# Patient Record
Sex: FEMALE | Race: AMERICAN INDIAN OR ALASKA NATIVE | HISPANIC OR LATINO | Employment: PART TIME | URBAN - METROPOLITAN AREA
[De-identification: names, ages, dates, MRNs, and addresses within clinical notes are randomized per-mention and may not be internally consistent; named-entity substitution may affect disease eponyms.]

---

## 2019-11-28 ENCOUNTER — HOSPITAL ENCOUNTER (EMERGENCY)
Facility: HOSPITAL | Age: 39
Discharge: HOME | End: 2019-11-28
Attending: EMERGENCY MEDICINE | Admitting: EMERGENCY MEDICINE
Payer: COMMERCIAL

## 2019-11-28 VITALS
RESPIRATION RATE: 19 BRPM | DIASTOLIC BLOOD PRESSURE: 55 MMHG | SYSTOLIC BLOOD PRESSURE: 111 MMHG | OXYGEN SATURATION: 99 % | HEART RATE: 64 BPM | WEIGHT: 158.73 LBS | BODY MASS INDEX: 29.51 KG/M2 | TEMPERATURE: 98.2 F

## 2019-11-28 DIAGNOSIS — R61 NIGHT SWEATS: ICD-10-CM

## 2019-11-28 DIAGNOSIS — R00.2 PALPITATIONS: Primary | ICD-10-CM

## 2019-11-28 DIAGNOSIS — F41.9 ANXIETY: ICD-10-CM

## 2019-11-28 DIAGNOSIS — R06.02 SHORT OF BREATH ON EXERTION: ICD-10-CM

## 2019-11-28 DIAGNOSIS — R53.83 FATIGUE: ICD-10-CM

## 2019-11-28 LAB
ANION GAP SERPL CALCULATED.3IONS-SCNC: 2 MMOL/L (ref 4–13)
BASOPHILS # BLD AUTO: 0.05 THOUSANDS/ΜL (ref 0–0.1)
BASOPHILS NFR BLD AUTO: 1 % (ref 0–1)
BUN SERPL-MCNC: 11 MG/DL (ref 5–25)
CALCIUM SERPL-MCNC: 8.7 MG/DL (ref 8.3–10.1)
CHLORIDE SERPL-SCNC: 108 MMOL/L (ref 100–108)
CO2 SERPL-SCNC: 30 MMOL/L (ref 21–32)
CREAT SERPL-MCNC: 0.73 MG/DL (ref 0.6–1.3)
EOSINOPHIL # BLD AUTO: 0.74 THOUSAND/ΜL (ref 0–0.61)
EOSINOPHIL NFR BLD AUTO: 10 % (ref 0–6)
ERYTHROCYTE [DISTWIDTH] IN BLOOD BY AUTOMATED COUNT: 12.6 % (ref 11.6–15.1)
GFR SERPL CREATININE-BSD FRML MDRD: 103 ML/MIN/1.73SQ M
GLUCOSE SERPL-MCNC: 77 MG/DL (ref 65–140)
HCT VFR BLD AUTO: 38 % (ref 34.8–46.1)
HGB BLD-MCNC: 12.2 G/DL (ref 11.5–15.4)
IMM GRANULOCYTES # BLD AUTO: 0.01 THOUSAND/UL (ref 0–0.2)
IMM GRANULOCYTES NFR BLD AUTO: 0 % (ref 0–2)
LYMPHOCYTES # BLD AUTO: 2.37 THOUSANDS/ΜL (ref 0.6–4.47)
LYMPHOCYTES NFR BLD AUTO: 31 % (ref 14–44)
MCH RBC QN AUTO: 31 PG (ref 26.8–34.3)
MCHC RBC AUTO-ENTMCNC: 32.1 G/DL (ref 31.4–37.4)
MCV RBC AUTO: 96 FL (ref 82–98)
MONOCYTES # BLD AUTO: 0.48 THOUSAND/ΜL (ref 0.17–1.22)
MONOCYTES NFR BLD AUTO: 6 % (ref 4–12)
NEUTROPHILS # BLD AUTO: 4.05 THOUSANDS/ΜL (ref 1.85–7.62)
NEUTS SEG NFR BLD AUTO: 52 % (ref 43–75)
NRBC BLD AUTO-RTO: 0 /100 WBCS
PLATELET # BLD AUTO: 269 THOUSANDS/UL (ref 149–390)
PMV BLD AUTO: 10.8 FL (ref 8.9–12.7)
POTASSIUM SERPL-SCNC: 3.9 MMOL/L (ref 3.5–5.3)
RBC # BLD AUTO: 3.94 MILLION/UL (ref 3.81–5.12)
SODIUM SERPL-SCNC: 140 MMOL/L (ref 136–145)
TROPONIN I SERPL-MCNC: <0.02 NG/ML
TSH SERPL DL<=0.05 MIU/L-ACNC: 2.08 UIU/ML (ref 0.36–3.74)
WBC # BLD AUTO: 7.7 THOUSAND/UL (ref 4.31–10.16)

## 2019-11-28 PROCEDURE — 84484 ASSAY OF TROPONIN QUANT: CPT | Performed by: EMERGENCY MEDICINE

## 2019-11-28 PROCEDURE — 99283 EMERGENCY DEPT VISIT LOW MDM: CPT | Performed by: EMERGENCY MEDICINE

## 2019-11-28 PROCEDURE — 80048 BASIC METABOLIC PNL TOTAL CA: CPT | Performed by: EMERGENCY MEDICINE

## 2019-11-28 PROCEDURE — 99285 EMERGENCY DEPT VISIT HI MDM: CPT

## 2019-11-28 PROCEDURE — 85025 COMPLETE CBC W/AUTO DIFF WBC: CPT | Performed by: EMERGENCY MEDICINE

## 2019-11-28 PROCEDURE — 36415 COLL VENOUS BLD VENIPUNCTURE: CPT | Performed by: EMERGENCY MEDICINE

## 2019-11-28 PROCEDURE — 93005 ELECTROCARDIOGRAM TRACING: CPT

## 2019-11-28 PROCEDURE — 84443 ASSAY THYROID STIM HORMONE: CPT | Performed by: EMERGENCY MEDICINE

## 2019-11-28 NOTE — ED PROVIDER NOTES
History  Chief Complaint   Patient presents with    Palpitations     10d of palpitations and night sweats, sob with any exercise       Palpitations   Palpitations quality:  Regular  Onset quality:  Gradual  Duration:  2 weeks  Timing:  Constant  Progression:  Waxing and waning  Chronicity:  New  Associated symptoms: shortness of breath    Associated symptoms: no chest pain, no cough, no nausea, no numbness, no vomiting and no weakness    Risk factors: no heart disease, no hx of atrial fibrillation, no hx of DVT, no hx of PE and no hx of thyroid disease        None       History reviewed  No pertinent past medical history  History reviewed  No pertinent surgical history  History reviewed  No pertinent family history  I have reviewed and agree with the history as documented  Social History     Tobacco Use    Smoking status: Never Smoker    Smokeless tobacco: Never Used   Substance Use Topics    Alcohol use: Not Currently    Drug use: Not Currently        Review of Systems   Constitutional: Positive for fatigue  Negative for chills and fever  HENT: Negative for rhinorrhea and sore throat  Eyes: Negative for photophobia and visual disturbance  Respiratory: Positive for shortness of breath  Negative for cough  Cardiovascular: Positive for palpitations  Negative for chest pain  Gastrointestinal: Negative for abdominal pain, blood in stool, diarrhea, nausea and vomiting  Genitourinary: Negative for dysuria, hematuria and menstrual problem  Musculoskeletal: Negative for neck pain and neck stiffness  Skin: Negative for rash and wound  Neurological: Negative for syncope, facial asymmetry, speech difficulty, weakness, numbness and headaches  Psychiatric/Behavioral: Negative for agitation and confusion  All other systems reviewed and are negative        Physical Exam  ED Triage Vitals   Temperature Pulse Respirations Blood Pressure SpO2   11/28/19 1327 11/28/19 1327 11/28/19 1323 11/28/19 1327 11/28/19 1327   98 2 °F (36 8 °C) 64 19 111/55 99 %      Temp src Heart Rate Source Patient Position - Orthostatic VS BP Location FiO2 (%)   -- -- -- -- --             Pain Score       --                    Orthostatic Vital Signs  Vitals:    11/28/19 1327   BP: 111/55   Pulse: 64       Physical Exam   Constitutional: She appears well-developed  No distress  HENT:   Head: Normocephalic  Right Ear: External ear normal    Left Ear: External ear normal    Nose: Nose normal    Mouth/Throat: Oropharynx is clear and moist    Eyes: Pupils are equal, round, and reactive to light  Conjunctivae and EOM are normal    Neck: No tracheal deviation present  Cardiovascular: Normal rate, normal heart sounds and intact distal pulses  No murmur heard  Pulmonary/Chest: Effort normal and breath sounds normal  No stridor  No respiratory distress  She has no wheezes  She has no rales  She exhibits no tenderness  Abdominal: Soft  She exhibits no distension  There is no tenderness  There is no rebound and no guarding  Musculoskeletal: She exhibits no edema, tenderness or deformity  Neurological: She is alert  No cranial nerve deficit or sensory deficit  She exhibits normal muscle tone  Skin: Skin is warm and dry  Capillary refill takes less than 2 seconds  She is not diaphoretic  Psychiatric: Her behavior is normal  Her mood appears anxious  Thought content is not paranoid and not delusional  She expresses no homicidal and no suicidal ideation  She expresses no suicidal plans and no homicidal plans  Nursing note and vitals reviewed        ED Medications  Medications - No data to display    Diagnostic Studies  Results Reviewed     Procedure Component Value Units Date/Time    Basic metabolic panel [[id number]]  (Abnormal) Collected:  11/28/19 1357    Lab Status:  Final result Specimen:  Blood from Arm, Left Updated:  11/28/19 1436     Sodium 140 mmol/L      Potassium 3 9 mmol/L      Chloride 108 mmol/L      CO2 30 mmol/L      ANION GAP 2 mmol/L      BUN 11 mg/dL      Creatinine 0 73 mg/dL      Glucose 77 mg/dL      Calcium 8 7 mg/dL      eGFR 103 ml/min/1 73sq m     Narrative:       [place] guidelines for Chronic Kidney Disease (CKD):     Stage 1 with normal or high GFR (GFR > 90 mL/min/1 73 square meters)    Stage 2 Mild CKD (GFR = 60-89 mL/min/1 73 square meters)    Stage 3A Moderate CKD (GFR = 45-59 mL/min/1 73 square meters)    Stage 3B Moderate CKD (GFR = 30-44 mL/min/1 73 square meters)    Stage 4 Severe CKD (GFR = 15-29 mL/min/1 73 square meters)    Stage 5 End Stage CKD (GFR <15 mL/min/1 73 square meters)  Note: GFR calculation is accurate only with a steady state creatinine    TSH, 3rd generation with Free T4 reflex [[id number]]  (Normal) Collected:  11/28/19 1357    Lab Status:  Final result Specimen:  Blood from Arm, Left Updated:  11/28/19 1436     TSH 3RD GENERATON 2 080 uIU/mL     Narrative:       Patients undergoing fluorescein dye angiography may retain small amounts of fluorescein in the body for 48-72 hours post procedure  Samples containing fluorescein can produce falsely depressed TSH values  If the patient had this procedure,a specimen should be resubmitted post fluorescein clearance        Troponin I [[id number]]  (Normal) Collected:  11/28/19 1357    Lab Status:  Final result Specimen:  Blood from Arm, Left Updated:  11/28/19 1429     Troponin I <0 02 ng/mL     CBC and differential [[id number]]  (Abnormal) Collected:  11/28/19 1357    Lab Status:  Final result Specimen:  Blood from Arm, Left Updated:  11/28/19 1409     WBC 7 70 Thousand/uL      RBC 3 94 Million/uL      Hemoglobin 12 2 g/dL      Hematocrit 38 0 %      MCV 96 fL      MCH 31 0 pg      MCHC 32 1 g/dL      RDW 12 6 %      MPV 10 8 fL      Platelets 627 Thousands/uL      nRBC 0 /100 WBCs      Neutrophils Relative 52 %      Immat GRANS % 0 %      Lymphocytes Relative 31 %      Monocytes Relative 6 %      Eosinophils Relative 10 %      Basophils Relative 1 %      Neutrophils Absolute 4 05 Thousands/µL      Immature Grans Absolute 0 01 Thousand/uL      Lymphocytes Absolute 2 37 Thousands/µL      Monocytes Absolute 0 48 Thousand/µL      Eosinophils Absolute 0 74 Thousand/µL      Basophils Absolute 0 05 Thousands/µL                  No orders to display         Procedures  Procedures        ED Course  ED Course as of Nov 28 1455   Thu Nov 28, 2019   1355 Procedure Note: EKG  Date/Time: 11/28/19 1:55 PM   Interpreted by: Agustin Lomas   Indications / Diagnosis: Palpitations  ECG reviewed by me, the ED Provider: yes   The EKG demonstrates:  Rhythm: normal sinus  Intervals: normal intervals  Axis: normal axis  QRS/Blocks: normal QRS  ST Changes: No acute ST Changes, no STD/R[place]           1413 Hemoglobin: 12 2   1438 Creatinine: 0 73   1438 Potassium: 3 9   1439 Troponin I: <0 02   1439 TSH 3RD GENERATON: 2 080   1451 No desats and normal HR w/ ambulatory pulse ox                  PERC Rule for PE      Most Recent Value   PERC Rule for PE   Age >=50  0 Filed at: 11/28/2019 1455   HR >=100  0 Filed at: 11/28/2019 1455   O2 Sat on room air < 95%  0 Filed at: 11/28/2019 1455   History of PE or DVT  0 Filed at: 11/28/2019 1455   Recent trauma or surgery  0 Filed at: 11/28/2019 1455   Hemoptysis  0 Filed at: 11/28/2019 1455   Exogenous estrogen  0 Filed at: 11/28/2019 1455   Unilateral leg swelling  0 Filed at: 11/28/2019 1455   PERC Rule for PE Results  0 Filed at: 11/28/2019 1455                Wells' Criteria for PE      Most Recent Value   Wells' Criteria for PE   Clinical signs and symptoms of DVT  0 Filed at: 11/28/2019 1455   PE is primary diagnosis or equally likely  0 Filed at: 11/28/2019 1455   HR >100  0 Filed at: 11/28/2019 1455   Immobilization at least 3 days or Surgery in the previous 4 weeks  0 Filed at: 11/28/2019 1455   Previous, objectively diagnosed PE or DVT  0 Filed at: 11/28/2019 1455   Hemoptysis  0 Filed at: 11/28/2019 1455   Malignancy with treatment within 6 months or palliative  0 Filed at: 11/28/2019 1455   Wells' Criteria Total  0 Filed at: 11/28/2019 1455            [facility]  Number of Diagnoses or Management Options  Anxiety:   Fatigue:   Night sweats:   Palpitations:   Short of breath on exertion:   Diagnosis management comments: This is a 49-year-old female that presents with chief complaint of palpitations  Patient reports that for the past 2 weeks she has been having a sense of palpitations which she describes as constant, also reports associated symptoms of generalized fatigue and mild shortness of breath, states that the symptoms are worse with exertion  She also reports that she has been having frequent night sweats  She denies any chest pain  She does endorse that she is experiencing palpitations at rest during the time of exam, she is noted to on the monitor having normal heart rate which appears to be sinus rhythm  She states that she has been evaluated for right ovarian mass which was biopsied by her gyn and she was told that it was benign, she denies any other history of malignancy, and otherwise denies any other medical problems  She denies any change in her menstrual cycle  She denies any heavy bleeding or melanotic stools  She does endorse some generalized anxiety as well although denies any specific stressors and no SI or HI  On exam she is well appearing with a regular heart rate, no murmurs, clear lungs, no edema in her legs  Overall her symptoms are nonspecific, will obtain a CBC for evidence of anemia, EKG given she endorses palpitations, will also obtain a troponin for atypical ACS  Will also check thyroid level  Will also obtain an ambulatory pulse ox as patient does endorse exertional shortness of breath          Disposition  Final diagnoses:   Palpitations   Night sweats   Fatigue   Anxiety   Short of breath on exertion     Time reflects when diagnosis was documented in both MDM as applicable and the Disposition within this note     Time User Action Codes Description Comment    11/28/2019  2:39 PM Martah Javed Add [R00 2] Palpitations     11/28/2019  2:39 PM Diogo Javed Add [R61] Night sweats     11/28/2019  2:39 PM Diogo Javed Add [R53 83] Fatigue     11/28/2019  2:40 PM Diogo Javed Add [F41 9] Anxiety     11/28/2019  2:40 PM Martha Javed Add [R06 02] Short of breath on exertion       ED Disposition     ED Disposition Condition Date/Time Comment    Discharge Stable Thu Nov 28, 2019  2:39 PM Kaitlin Abdul discharge to home/self care  Follow-up Information     Follow up With Specialties Details Why Contact Info    Yogi Sutton DO Family Medicine Schedule an appointment as soon as possible for a visit   [address] [place] [address]  [phone]            Patient's Medications    No medications on file     No discharge procedures on file  ED Provider  Attending physically available and evaluated Idelia Flight  I managed the patient along with the ED Attending      Electronically Signed by         Tabby Mackay MD  11/28/19 MD John  11/28/19 [id number]

## 2019-11-28 NOTE — ED ATTENDING ATTESTATION
I,Jaya Burns MD, saw and evaluated the patient  I have discussed the patient with the resident/non-physician practitioner and agree with the resident's/non-physician practitioner's findings, Plan of Care, and MDM as documented in the resident's/non-physician practitioner's note, except where noted  All available labs and Radiology studies were reviewed  I was present for key portions of any procedure(s) performed by the resident/non-physician practitioner and I was immediately available to provide assistance  At this point I agree with the current assessment done in the Emergency Department  I have conducted an independent evaluation of this patient including a focused history and a physical exam         39-year-old female presenting to the emergency department for evaluation of multiple complaints  Patient's primary complaint is sensation of palpitations like her heart is racing that has been occurring over the past 2 weeks  Patient states this is particularly exacerbated by ambulation  The patient states that she also has some shortness of breath and fatigue with ambulation  Additionally the patient notes that she has been getting diaphoretic at night  Patient reports that she has been following regularly with OBGYN and had mentioned the night sweats to OBGYN who had stated that she was not undergoing menopause  No chest pain  No abdominal pain, nausea, vomiting, diarrhea, black or bloody stools  Ten systems reviewed and negative except as noted  The patient is resting comfortably on a stretcher in no acute respiratory distress  The patient appears nontoxic  HEENT reveals moist mucous membranes  Head is normocephalic and atraumatic  Conjunctiva and sclera are normal  Neck is nontender and supple with full range of motion to flexion, extension, lateral rotation  No meningismus appreciated  No masses are appreciated   Lungs are clear to auscultation bilaterally without any wheezes, rales or rhonchi  Heart is regular rate and rhythm without any murmurs, rubs or gallops  Abdomen is soft and nontender without any rebound or guarding  Extremities appear grossly normal without any significant arthropathy  Patient is awake, alert, and oriented x3  The patient has normal interaction  Motor is 5 out of 5  Patient with unremarkable exam   She complains of palpitations of rapid heartbeat while she is having a normal rhythm on the monitor at a normal rate between 60 and 70      Labs Reviewed   CBC AND DIFFERENTIAL - Abnormal       Result Value Ref Range Status    WBC 7 70  4 31 - 10 16 Thousand/uL Final    RBC 3 94  3 81 - 5 12 Million/uL Final    Hemoglobin 12 2  11 5 - 15 4 g/dL Final    Hematocrit 38 0  34 8 - 46 1 % Final    MCV 96  82 - 98 fL Final    MCH 31 0  26 8 - 34 3 pg Final    MCHC 32 1  31 4 - 37 4 g/dL Final    RDW 12 6  11 6 - 15 1 % Final    MPV 10 8  8 9 - 12 7 fL Final    Platelets 477  784 - 390 Thousands/uL Final    nRBC 0  /100 WBCs Final    Neutrophils Relative 52  43 - 75 % Final    Immat GRANS % 0  0 - 2 % Final    Lymphocytes Relative 31  14 - 44 % Final    Monocytes Relative 6  4 - 12 % Final    Eosinophils Relative 10 (*) 0 - 6 % Final    Basophils Relative 1  0 - 1 % Final    Neutrophils Absolute 4 05  1 85 - 7 62 Thousands/µL Final    Immature Grans Absolute 0 01  0 00 - 0 20 Thousand/uL Final    Lymphocytes Absolute 2 37  0 60 - 4 47 Thousands/µL Final    Monocytes Absolute 0 48  0 17 - 1 22 Thousand/µL Final    Eosinophils Absolute 0 74 (*) 0 00 - 0 61 Thousand/µL Final    Basophils Absolute 0 05  0 00 - 0 10 Thousands/µL Final   BASIC METABOLIC PANEL - Abnormal    Sodium 140  136 - 145 mmol/L Final    Potassium 3 9  3 5 - 5 3 mmol/L Final    Chloride 108  100 - 108 mmol/L Final    CO2 30  21 - 32 mmol/L Final    ANION GAP 2 (*) 4 - 13 mmol/L Final    BUN 11  5 - 25 mg/dL Final    Creatinine 0 73  0 60 - 1 30 mg/dL Final    Comment: Standardized to IDMS reference method Glucose 77  65 - 140 mg/dL Final    Comment:   If the patient is fasting, the ADA then defines impaired fasting glucose as > 100 mg/dL and diabetes as > or equal to 123 mg/dL  Specimen collection should occur prior to Sulfasalazine administration due to the potential for falsely depressed results  Specimen collection should occur prior to Sulfapyridine administration due to the potential for falsely elevated results  Calcium 8 7  8 3 - 10 1 mg/dL Final    eGFR 103  ml/min/1 73sq m Final    Narrative:     [place] guidelines for Chronic Kidney Disease (CKD):     Stage 1 with normal or high GFR (GFR > 90 mL/min/1 73 square meters)    Stage 2 Mild CKD (GFR = 60-89 mL/min/1 73 square meters)    Stage 3A Moderate CKD (GFR = 45-59 mL/min/1 73 square meters)    Stage 3B Moderate CKD (GFR = 30-44 mL/min/1 73 square meters)    Stage 4 Severe CKD (GFR = 15-29 mL/min/1 73 square meters)    Stage 5 End Stage CKD (GFR <15 mL/min/1 73 square meters)  Note: GFR calculation is accurate only with a steady state creatinine   TSH, 3RD GENERATION WITH FREE T4 REFLEX - Normal    TSH 3RD [facility] 2 080  0 358 - 3 740 uIU/mL Final    Comment:   The recommended reference ranges for TSH during pregnancy are as follows:   First trimester 0 1 to 2 5 uIU/mL   Second trimester  0 2 to 3 0 uIU/mL   Third trimester 0 3 to 3 0 uIU/m    Note: Normal ranges may not apply to patients who are transgender, non-binary, or whose legal sex, sex at birth, and gender identity differ  Using supplements with high doses of biotin 20 to more than 300 times greater than the adequate daily intake for adults of 30 mcg/day as established by the [employer], can cause falsely depress results  Narrative:     Patients undergoing fluorescein dye angiography may retain small amounts of fluorescein in the body for 48-72 hours post procedure  Samples containing fluorescein can produce falsely depressed TSH values   If the patient had this procedure,a specimen should be resubmitted post fluorescein clearance  TROPONIN I - Normal    Troponin I <0 02  <=0 04 ng/mL Final    Comment:   [employer] Chemistry analyzer 99% cutoff is > 0 04 ng/mL in network labs     o cTnI 99% cutoff is useful only when applied to patients in the clinical setting of myocardial ischemia   o cTnI 99% cutoff should be interpreted in the context of clinical history, ECG findings and possibly cardiac imaging to establish correct diagnosis  o cTnI 99% cutoff may be suggestive but clearly not indicative of a coronary event without the clinical setting of myocardial ischemia           No orders to display

## 2019-11-29 LAB
ATRIAL RATE: 69 BPM
P AXIS: 59 DEGREES
PR INTERVAL: 148 MS
QRS AXIS: 69 DEGREES
QRSD INTERVAL: 60 MS
QT INTERVAL: 382 MS
QTC INTERVAL: 409 MS
T WAVE AXIS: 52 DEGREES
VENTRICULAR RATE: 69 BPM

## 2019-11-29 PROCEDURE — 93010 ELECTROCARDIOGRAM REPORT: CPT | Performed by: INTERNAL MEDICINE

## 2024-06-04 ENCOUNTER — HOSPITAL ENCOUNTER (EMERGENCY)
Facility: HOSPITAL | Age: 44
Discharge: HOME | End: 2024-06-04
Attending: EMERGENCY MEDICINE
Payer: MEDICARE

## 2024-06-04 ENCOUNTER — APPOINTMENT (EMERGENCY)
Dept: RADIOLOGY | Facility: HOSPITAL | Age: 44
End: 2024-06-04
Payer: MEDICARE

## 2024-06-04 VITALS
SYSTOLIC BLOOD PRESSURE: 132 MMHG | DIASTOLIC BLOOD PRESSURE: 68 MMHG | TEMPERATURE: 98 F | OXYGEN SATURATION: 98 % | RESPIRATION RATE: 16 BRPM | HEART RATE: 80 BPM

## 2024-06-04 DIAGNOSIS — D64.9 ANEMIA: ICD-10-CM

## 2024-06-04 PROBLEM — R60.9 PITTING EDEMA: Status: ACTIVE | Noted: 2024-06-04

## 2024-06-04 PROBLEM — Z98.891 STATUS POST REPEAT LOW TRANSVERSE CESAREAN SECTION: Status: ACTIVE | Noted: 2024-06-04

## 2024-06-04 LAB
ALBUMIN SERPL BCP-MCNC: 3.3 G/DL (ref 3.5–5)
ALP SERPL-CCNC: 81 U/L (ref 34–104)
ALT SERPL W P-5'-P-CCNC: 33 U/L (ref 7–52)
ANION GAP SERPL CALCULATED.3IONS-SCNC: 7 MMOL/L (ref 4–13)
APTT PPP: 26 SECONDS (ref 23–37)
AST SERPL W P-5'-P-CCNC: 27 U/L (ref 13–39)
ATRIAL RATE: 79 BPM
BACTERIA UR QL AUTO: ABNORMAL /HPF
BASOPHILS # BLD AUTO: 0.03 THOUSANDS/ÂΜL (ref 0–0.1)
BASOPHILS NFR BLD AUTO: 0 % (ref 0–1)
BILIRUB SERPL-MCNC: 0.29 MG/DL (ref 0.2–1)
BILIRUB UR QL STRIP: NEGATIVE
BLD SMEAR INTERP: NORMAL
BUN SERPL-MCNC: 7 MG/DL (ref 5–25)
CALCIUM ALBUM COR SERPL-MCNC: 9.4 MG/DL (ref 8.3–10.1)
CALCIUM SERPL-MCNC: 8.8 MG/DL (ref 8.4–10.2)
CARDIAC TROPONIN I PNL SERPL HS: 3 NG/L
CHLORIDE SERPL-SCNC: 103 MMOL/L (ref 96–108)
CLARITY UR: CLEAR
CO2 SERPL-SCNC: 26 MMOL/L (ref 21–32)
COLOR UR: COLORLESS
CREAT SERPL-MCNC: 0.59 MG/DL (ref 0.6–1.3)
CREAT UR-MCNC: 30.2 MG/DL
EOSINOPHIL # BLD AUTO: 0.12 THOUSAND/ÂΜL (ref 0–0.61)
EOSINOPHIL NFR BLD AUTO: 2 % (ref 0–6)
ERYTHROCYTE [DISTWIDTH] IN BLOOD BY AUTOMATED COUNT: 13.4 % (ref 11.6–15.1)
GFR SERPL CREATININE-BSD FRML MDRD: 110 ML/MIN/1.73SQ M
GLUCOSE SERPL-MCNC: 111 MG/DL (ref 65–140)
GLUCOSE UR STRIP-MCNC: NEGATIVE MG/DL
HCT VFR BLD AUTO: 28.7 % (ref 34.8–46.1)
HGB BLD-MCNC: 9.3 G/DL (ref 11.5–15.4)
HGB UR QL STRIP.AUTO: ABNORMAL
IMM GRANULOCYTES # BLD AUTO: 0.14 THOUSAND/UL (ref 0–0.2)
IMM GRANULOCYTES NFR BLD AUTO: 2 % (ref 0–2)
INR PPP: 0.94 (ref 0.84–1.19)
KETONES UR STRIP-MCNC: NEGATIVE MG/DL
LDH SERPL-CCNC: 318 U/L (ref 140–271)
LEUKOCYTE ESTERASE UR QL STRIP: ABNORMAL
LYMPHOCYTES # BLD AUTO: 1.36 THOUSANDS/ÂΜL (ref 0.6–4.47)
LYMPHOCYTES NFR BLD AUTO: 17 % (ref 14–44)
MCH RBC QN AUTO: 32.6 PG (ref 26.8–34.3)
MCHC RBC AUTO-ENTMCNC: 32.4 G/DL (ref 31.4–37.4)
MCV RBC AUTO: 101 FL (ref 82–98)
MONOCYTES # BLD AUTO: 0.38 THOUSAND/ÂΜL (ref 0.17–1.22)
MONOCYTES NFR BLD AUTO: 5 % (ref 4–12)
NEUTROPHILS # BLD AUTO: 6.01 THOUSANDS/ÂΜL (ref 1.85–7.62)
NEUTS SEG NFR BLD AUTO: 74 % (ref 43–75)
NITRITE UR QL STRIP: NEGATIVE
NON-SQ EPI CELLS URNS QL MICRO: ABNORMAL /HPF
NRBC BLD AUTO-RTO: 0 /100 WBCS
P AXIS: 72 DEGREES
PH UR STRIP.AUTO: 7.5 [PH]
PLATELET # BLD AUTO: 281 THOUSANDS/UL (ref 149–390)
PMV BLD AUTO: 10.2 FL (ref 8.9–12.7)
POTASSIUM SERPL-SCNC: 4.1 MMOL/L (ref 3.5–5.3)
PR INTERVAL: 140 MS
PROT SERPL-MCNC: 6.1 G/DL (ref 6.4–8.4)
PROT UR STRIP-MCNC: NEGATIVE MG/DL
PROT UR-MCNC: 12 MG/DL
PROT/CREAT UR: 0.4 MG/G{CREAT} (ref 0–0.1)
PROTHROMBIN TIME: 13.2 SECONDS (ref 11.6–14.5)
QRS AXIS: 73 DEGREES
QRSD INTERVAL: 60 MS
QT INTERVAL: 352 MS
QTC INTERVAL: 403 MS
RBC # BLD AUTO: 2.85 MILLION/UL (ref 3.81–5.12)
RBC #/AREA URNS AUTO: ABNORMAL /HPF
SODIUM SERPL-SCNC: 136 MMOL/L (ref 135–147)
SP GR UR STRIP.AUTO: 1 (ref 1–1.03)
T WAVE AXIS: 67 DEGREES
TSH SERPL DL<=0.05 MIU/L-ACNC: 2.06 UIU/ML (ref 0.45–4.5)
URATE SERPL-MCNC: 5.7 MG/DL (ref 2–7.5)
UROBILINOGEN UR STRIP-ACNC: <2 MG/DL
VENTRICULAR RATE: 79 BPM
WBC # BLD AUTO: 8.04 THOUSAND/UL (ref 4.31–10.16)
WBC #/AREA URNS AUTO: ABNORMAL /HPF

## 2024-06-04 PROCEDURE — 85610 PROTHROMBIN TIME: CPT

## 2024-06-04 PROCEDURE — 85730 THROMBOPLASTIN TIME PARTIAL: CPT

## 2024-06-04 PROCEDURE — 81001 URINALYSIS AUTO W/SCOPE: CPT

## 2024-06-04 PROCEDURE — 84156 ASSAY OF PROTEIN URINE: CPT

## 2024-06-04 PROCEDURE — 84484 ASSAY OF TROPONIN QUANT: CPT | Performed by: EMERGENCY MEDICINE

## 2024-06-04 PROCEDURE — 87186 SC STD MICRODIL/AGAR DIL: CPT

## 2024-06-04 PROCEDURE — 80053 COMPREHEN METABOLIC PANEL: CPT | Performed by: EMERGENCY MEDICINE

## 2024-06-04 PROCEDURE — 99244 OFF/OP CNSLTJ NEW/EST MOD 40: CPT | Performed by: OBSTETRICS & GYNECOLOGY

## 2024-06-04 PROCEDURE — 83615 LACTATE (LD) (LDH) ENZYME: CPT

## 2024-06-04 PROCEDURE — 87086 URINE CULTURE/COLONY COUNT: CPT

## 2024-06-04 PROCEDURE — 85025 COMPLETE CBC W/AUTO DIFF WBC: CPT | Performed by: EMERGENCY MEDICINE

## 2024-06-04 PROCEDURE — 93005 ELECTROCARDIOGRAM TRACING: CPT

## 2024-06-04 PROCEDURE — 36415 COLL VENOUS BLD VENIPUNCTURE: CPT

## 2024-06-04 PROCEDURE — 96365 THER/PROPH/DIAG IV INF INIT: CPT

## 2024-06-04 PROCEDURE — 71045 X-RAY EXAM CHEST 1 VIEW: CPT

## 2024-06-04 PROCEDURE — 99284 EMERGENCY DEPT VISIT MOD MDM: CPT

## 2024-06-04 PROCEDURE — 99285 EMERGENCY DEPT VISIT HI MDM: CPT | Performed by: EMERGENCY MEDICINE

## 2024-06-04 PROCEDURE — 93010 ELECTROCARDIOGRAM REPORT: CPT | Performed by: INTERNAL MEDICINE

## 2024-06-04 PROCEDURE — 87077 CULTURE AEROBIC IDENTIFY: CPT

## 2024-06-04 PROCEDURE — 84550 ASSAY OF BLOOD/URIC ACID: CPT

## 2024-06-04 PROCEDURE — 84443 ASSAY THYROID STIM HORMONE: CPT

## 2024-06-04 PROCEDURE — 82570 ASSAY OF URINE CREATININE: CPT

## 2024-06-04 PROCEDURE — 76857 US EXAM PELVIC LIMITED: CPT | Performed by: EMERGENCY MEDICINE

## 2024-06-04 RX ORDER — HYDROXYZINE HYDROCHLORIDE 25 MG/1
25 TABLET, FILM COATED ORAL ONCE
Status: DISCONTINUED | OUTPATIENT
Start: 2024-06-04 | End: 2024-06-04 | Stop reason: HOSPADM

## 2024-06-04 RX ADMIN — IRON SUCROSE 200 MG: 20 INJECTION, SOLUTION INTRAVENOUS at 15:02

## 2024-06-04 NOTE — ASSESSMENT & PLAN NOTE
Patient is POD#5 s/p RLTCS at [facility] who presents with abdominal pain and distension, vaginal bleeding, fatigue, and palpitations. Patient appears to be recovering as expected postpartum. She is tearful and would benefit from close follow-up with her obstetrician. Patient denied any suicidal ideation or thoughts of harming herself, her baby, or others.    - abdomen is soft and nontender, uterine fundus firm, mepilex dressing in place  - continue routine postop care  - recommend abdominal binder, continue use of miralax  - patient to make postpartum appointment with her ob

## 2024-06-04 NOTE — CONSULTS
Consultation - Obstetrics & Gynecology  Kaitlin Abdul 44 y.o. female MRN: [id number]  Unit/Bed#: ED-20 Encounter: [id number]      Assessment & Plan     * Status post repeat low transverse  section  Assessment & Plan  Patient is POD#5 s/p RLTCS at [facility] who presents with abdominal pain and distension, vaginal bleeding, fatigue, and palpitations. Patient appears to be recovering as expected postpartum. She is tearful and would benefit from close follow-up with her obstetrician. Patient denied any suicidal ideation or thoughts of harming herself, her baby, or others.    - abdomen is soft and nontender, uterine fundus firm, mepilex dressing in place  - continue routine postop care  - recommend abdominal binder, continue use of miralax  - patient to make postpartum appointment with her ob    Anemia  Assessment & Plan  Patient receiving venofer infusions intrapartum  Hgb 9.3 in ED  - Recommend IV Venofer while in ED  - continue PO Iron and prenatal vitamins    Pitting edema  Assessment & Plan  2+ pitting edema bilaterally  CXR demonstrates normal cardiac silhouette, no pleural effusions   Normal EKG  - Recommend compression stockings and elevating feet        Counseling / Coordination of Care  Total floor / unit time spent today20 minutes  minutes. Greater than 50% of total time was spent with the patient and / or family counseling and / or coordination of care.     Plan of care discussed with Dr. Frazier.     Consult to obstetrics / gynecology  Consult performed by: Cierra Pichardo MD  Consult ordered by: Chanda Kramer MD        History of Present Illness   Physician Requesting Consult: Chanda Kramer MD  Reason for Consult / Principal Problem: abdominal distension, pitting edema    HPI: Kaitlin Abdul is a 44 y.o.  POD#5 s/p RLTCS who presents with lower abdominal pain and distension, palpitations, vaginal bleeding, and lower extremity swelling. She reports these symptoms have been  present since her C-Sections. She was previously passing large clots, however now is having minimal bleeding in the past 2 days. She had a bowel movement 2 days ago, but continues to pass flatus. She reports minimal appetite, denies nausea and vomiting.     Review of Systems   Constitutional:  Positive for activity change, appetite change and fatigue. Negative for chills and fever.   Respiratory:  Positive for shortness of breath. Negative for cough.    Cardiovascular:  Positive for palpitations and leg swelling. Negative for chest pain.   Gastrointestinal:  Positive for abdominal distention, abdominal pain and constipation. Negative for diarrhea, nausea and vomiting.   Genitourinary:  Positive for pelvic pain and vaginal bleeding. Negative for flank pain and vaginal discharge.       Historical Information   History reviewed. No pertinent past medical history.  History reviewed. No pertinent surgical history.  OB History    Para Term  AB Living   5 3 3 0 2 3   SAB IAB Ectopic Multiple Live Births           3      # Outcome Date GA Lbr Zion/2nd Weight Sex Type Anes PTL Lv   5 AB            4 AB            3 Term            2 Term            1 Term              History reviewed. No pertinent family history.  Social History   Social History     Substance and Sexual Activity   Alcohol Use Not Currently     Social History     Substance and Sexual Activity   Drug Use Not Currently     Social History     Tobacco Use   Smoking Status Never   Smokeless Tobacco Never       Meds/Allergies   Current Facility-Administered Medications   Medication Dose Route Frequency    hydrOXYzine HCL (ATARAX) tablet 25 mg  25 mg Oral Once       Allergies   Allergen Reactions    Medical Tape Itching     ECG patches & sandpaper       Objective   Vitals: Blood pressure 132/68, pulse 80, temperature 98 °F (36.7 °C), temperature source Oral, resp. rate 16, SpO2 98%, currently breastfeeding. There is no height or weight on file to  calculate BMI.      Intake/Output Summary (Last 24 hours) at 6/4/2024 1617  Last data filed at 6/4/2024 1616  Gross per 24 hour   Intake 100 ml   Output --   Net 100 ml       Invasive Devices       Peripheral Intravenous Line  Duration             Peripheral IV 06/04/24 Left Antecubital <1 day                    Physical Exam  Constitutional:       General: She is not in acute distress.     Appearance: She is obese. She is not ill-appearing or toxic-appearing.      Comments: tearful   Cardiovascular:      Rate and Rhythm: Normal rate.   Pulmonary:      Effort: Pulmonary effort is normal. No respiratory distress.   Chest:      Chest wall: No tenderness.   Abdominal:      Palpations: Abdomen is soft.      Tenderness: There is no abdominal tenderness. There is no guarding or rebound.      Comments: Mepilex dressing in place covering incision   Musculoskeletal:         General: No swelling or tenderness.      Right lower leg: Edema (2+ pitting edema) present.      Left lower leg: Edema (2+ pitting edema) present.   Skin:     General: Skin is warm and dry.      Coloration: Skin is pale.   Neurological:      Mental Status: She is alert.         Lab Results:   Recent Results (from the past 24 hour(s))   ECG 12 lead    Collection Time: 06/04/24 12:29 PM   Result Value Ref Range    Ventricular Rate 79 BPM    Atrial Rate 79 BPM    NV Interval 140 ms    QRSD Interval 60 ms    QT Interval 352 ms    QTC Interval 403 ms    P Moran 72 degrees    QRS Axis 73 degrees    T Wave Axis 67 degrees   CBC and differential    Collection Time: 06/04/24 12:34 PM   Result Value Ref Range    WBC 8.04 4.31 - 10.16 Thousand/uL    RBC 2.85 (L) 3.81 - 5.12 Million/uL    Hemoglobin 9.3 (L) 11.5 - 15.4 g/dL    Hematocrit 28.7 (L) 34.8 - 46.1 %     (H) 82 - 98 fL    MCH 32.6 26.8 - 34.3 pg    MCHC 32.4 31.4 - 37.4 g/dL    RDW 13.4 11.6 - 15.1 %    MPV 10.2 8.9 - 12.7 fL    Platelets 281 149 - 390 Thousands/uL    nRBC 0 /100 WBCs    Segmented %  "74 43 - 75 %    Immature Grans % 2 0 - 2 %    Lymphocytes % 17 14 - 44 %    Monocytes % 5 4 - 12 %    Eosinophils Relative 2 0 - 6 %    Basophils Relative 0 0 - 1 %    Absolute Neutrophils 6.01 1.85 - 7.62 Thousands/µL    Absolute Immature Grans 0.14 0.00 - 0.20 Thousand/uL    Absolute Lymphocytes 1.36 0.60 - 4.47 Thousands/µL    Absolute Monocytes 0.38 0.17 - 1.22 Thousand/µL    Eosinophils Absolute 0.12 0.00 - 0.61 Thousand/µL    Basophils Absolute 0.03 0.00 - 0.10 Thousands/µL   Comprehensive metabolic panel    Collection Time: 06/04/24 12:34 PM   Result Value Ref Range    Sodium 136 135 - 147 mmol/L    Potassium 4.1 3.5 - 5.3 mmol/L    Chloride 103 96 - 108 mmol/L    CO2 26 21 - 32 mmol/L    ANION GAP 7 4 - 13 mmol/L    BUN 7 5 - 25 mg/dL    Creatinine 0.59 (L) 0.60 - 1.30 mg/dL    Glucose 111 65 - 140 mg/dL    Calcium 8.8 8.4 - 10.2 mg/dL    Corrected Calcium 9.4 8.3 - 10.1 mg/dL    AST 27 13 - 39 U/L    ALT 33 7 - 52 U/L    Alkaline Phosphatase 81 34 - 104 U/L    Total Protein 6.1 (L) 6.4 - 8.4 g/dL    Albumin 3.3 (L) 3.5 - 5.0 g/dL    Total Bilirubin 0.29 0.20 - 1.00 mg/dL    eGFR 110 ml/min/1.73sq m   HS Troponin 0hr (reflex protocol)    Collection Time: 06/04/24 12:34 PM   Result Value Ref Range    hs TnI 0hr 3 \"Refer to ACS Flowchart\"- see link ng/L   TSH, 3rd generation with Free T4 reflex    Collection Time: 06/04/24 12:34 PM   Result Value Ref Range    TSH 3RD GENERATON 2.056 0.450 - 4.500 uIU/mL   Hemolysis Smear    Collection Time: 06/04/24  1:19 PM   Result Value Ref Range    Hemolysis Smear No Schistocytes or Helmet Cells noted    Protime-INR    Collection Time: 06/04/24  1:19 PM   Result Value Ref Range    Protime 13.2 11.6 - 14.5 seconds    INR 0.94 0.84 - 1.19   APTT    Collection Time: 06/04/24  1:19 PM   Result Value Ref Range    PTT 26 23 - 37 seconds   LD    Collection Time: 06/04/24  1:25 PM   Result Value Ref Range     (H) 140 - 271 U/L   Uric acid    Collection Time: 06/04/24  1:25 " PM   Result Value Ref Range    Uric Acid 5.7 2.0 - 7.5 mg/dL   Protein / creatinine ratio, urine    Collection Time: 06/04/24  2:22 PM   Result Value Ref Range    Creatinine, Ur 30.2 Reference range not established. mg/dL    Protein Urine Random 12 Reference range not established. mg/dL    Prot/Creat Ratio, Ur 0.40 (H) 0.00 - 0.10   Urinalysis    Collection Time: 06/04/24  2:22 PM    Specimen: Urine, Clean Catch   Result Value Ref Range    Color, UA Colorless     Clarity, UA Clear     Specific Gravity, UA 1.005 1.003 - 1.030    pH, UA 7.5 4.5, 5.0, 5.5, 6.0, 6.5, 7.0, 7.5, 8.0    Leukocytes, UA Small (A) Negative    Nitrite, UA Negative Negative    Protein, UA Negative Negative mg/dl    Glucose, UA Negative Negative mg/dl    Ketones, UA Negative Negative mg/dl    Urobilinogen, UA <2.0 <2.0 mg/dl mg/dl    Bilirubin, UA Negative Negative    Occult Blood, UA Large (A) Negative   Urine Microscopic    Collection Time: 06/04/24  2:22 PM   Result Value Ref Range    RBC, UA 30-50 (A) None Seen, 1-2 /hpf    WBC, UA 10-20 (A) None Seen, 1-2 /hpf    Epithelial Cells Moderate (A) None Seen, Occasional /hpf    Bacteria, UA None Seen None Seen, Occasional /hpf       Imaging:  I have personally reviewed pertinent reports    EKG, Pathology, and Other Studies: I have personally reviewed pertinent reports.      Cierra Pichardo MD  Obstetrics & Gynecology, PGY-2  6/4/2024, 4:17 PM

## 2024-06-04 NOTE — ED PROVIDER NOTES
History  Chief Complaint   Patient presents with    Fatigue     Pt gave birth . Very tired, still has vaginal bleeding. Reports left hand and foot swelling. Pt also reports palpitations      44-year-old female status post  section delivery on  presenting due to lower abdominal pain, increasing distention, lightheadedness, lower extremity swelling and passage of large clots vaginally.  Patient states she has been having progressively worsening abdominal pain and abdominal distention since her procedure.  States she had a bowel movement 2 days ago and continues to pass gas currently.  Patient was having vaginal bleeding which has slowed down but is currently passing large clots at this time.  Patient denies any other abnormal vaginal discharge, fever.  Patient endorses a feeling of shortness of breath and lightheadedness.  Patient also has some swelling of her lower extremities.        None       History reviewed. No pertinent past medical history.    History reviewed. No pertinent surgical history.    History reviewed. No pertinent family history.  I have reviewed and agree with the history as documented.    E-Cigarette/Vaping     E-Cigarette/Vaping Substances     Social History     Tobacco Use    Smoking status: Never    Smokeless tobacco: Never   Substance Use Topics    Alcohol use: Not Currently    Drug use: Not Currently        Review of Systems   Constitutional:  Positive for chills. Negative for fever.   HENT:  Negative for ear pain and sore throat.    Eyes:  Negative for pain and visual disturbance.   Respiratory:  Positive for shortness of breath. Negative for cough.    Cardiovascular:  Negative for chest pain and palpitations.   Gastrointestinal:  Positive for abdominal distention, abdominal pain and nausea. Negative for vomiting.   Genitourinary:  Positive for pelvic pain and vaginal bleeding. Negative for dysuria and vaginal discharge.   Musculoskeletal:  Negative for arthralgias and back  pain.   Skin:  Positive for wound. Negative for color change and rash.   Neurological:  Positive for light-headedness. Negative for dizziness, seizures, syncope, weakness and headaches.   All other systems reviewed and are negative.      Physical Exam  ED Triage Vitals   Temperature Pulse Respirations Blood Pressure SpO2   06/04/24 1221 06/04/24 1221 06/04/24 1221 06/04/24 1221 06/04/24 1221   98 °F (36.7 °C) 85 20 165/65 98 %      Temp Source Heart Rate Source Patient Position - Orthostatic VS BP Location FiO2 (%)   06/04/24 1221 06/04/24 1221 06/04/24 1221 06/04/24 1221 --   Oral Monitor Sitting Right arm       Pain Score       06/04/24 1315       8             Orthostatic Vital Signs  Vitals:    06/04/24 1315 06/04/24 1345 06/04/24 1400 06/04/24 1600   BP: 126/63 132/61 127/65 132/68   Pulse: 81 79 80 80   Patient Position - Orthostatic VS: Sitting Sitting Sitting Lying       Physical Exam  Vitals and nursing note reviewed.   Constitutional:       General: She is in acute distress.      Appearance: She is well-developed.   HENT:      Head: Normocephalic and atraumatic.      Nose: Nose normal. No congestion.   Eyes:      Extraocular Movements: Extraocular movements intact.      Conjunctiva/sclera: Conjunctivae normal.      Pupils: Pupils are equal, round, and reactive to light.   Cardiovascular:      Rate and Rhythm: Normal rate and regular rhythm.      Pulses: Normal pulses.      Heart sounds: Normal heart sounds. No murmur heard.  Pulmonary:      Effort: Pulmonary effort is normal. No respiratory distress.      Breath sounds: Normal breath sounds. No wheezing or rales.   Chest:      Chest wall: No tenderness.   Abdominal:      General: Abdomen is flat. Bowel sounds are normal. There is distension.      Palpations: Abdomen is soft.      Tenderness: There is abdominal tenderness. There is left CVA tenderness. There is no right CVA tenderness.      Comments: Patient abdomen does appear slightly distended and tender  to palpation of the lower pelvic area.  Previous  incision site present, no dehiscence or signs of infection appreciated.   Musculoskeletal:         General: No tenderness, deformity or signs of injury. Normal range of motion.      Cervical back: Normal range of motion and neck supple. No rigidity or tenderness.      Right lower leg: Edema present.      Left lower leg: Edema present.   Skin:     General: Skin is warm and dry.      Findings: No bruising, lesion or rash.   Neurological:      General: No focal deficit present.      Mental Status: She is alert.      Cranial Nerves: No cranial nerve deficit.      Sensory: No sensory deficit.         ED Medications  Medications   hydrOXYzine HCL (ATARAX) tablet 25 mg (25 mg Oral Not Given 24 1448)   iron sucrose (VENOFER) 200 mg in sodium chloride 0.9 % 100 mL IVPB (200 mg Intravenous New Bag 24 1502)       Diagnostic Studies  Results Reviewed       Procedure Component Value Units Date/Time    Protein / creatinine ratio, urine [[id number]]  (Abnormal) Collected: 24    Lab Status: Final result Specimen: Urine, Clean Catch Updated: 24 1446     Creatinine, Ur 30.2 mg/dL      Protein Urine Random 12 mg/dL      Prot/Creat Ratio, Ur 0.40    Urine Microscopic [[id number]]  (Abnormal) Collected: 24    Lab Status: Final result Specimen: Urine, Clean Catch Updated: 24 1445     RBC, UA 30-50 /hpf      WBC, UA 10-20 /hpf      Epithelial Cells Moderate /hpf      Bacteria, UA None Seen /hpf     Urine culture [[id number]] Collected: 24    Lab Status: In process Specimen: Urine, Clean Catch Updated: 24 1445    Urinalysis [[id number]]  (Abnormal) Collected: 24    Lab Status: Final result Specimen: Urine, Clean Catch Updated: 24 1436     Color, UA Colorless     Clarity, UA Clear     Specific Gravity, UA 1.005     pH, UA 7.5     Leukocytes, UA Small     Nitrite, UA Negative     Protein, UA Negative mg/dl       Glucose, UA Negative mg/dl      Ketones, UA Negative mg/dl      Urobilinogen, UA <2.0 mg/dl      Bilirubin, UA Negative     Occult Blood, UA Large    Hemolysis Smear [[id number]] Collected: 06/04/24 1319    Lab Status: Final result Specimen: Arm, Left Updated: 06/04/24 1407     Hemolysis Smear No Schistocytes or Helmet Cells noted    TSH, 3rd generation with Free T4 reflex [[id number]]  (Normal) Collected: 06/04/24 1234    Lab Status: Final result Specimen: Blood from Arm, Left Updated: 06/04/24 1405     TSH 3RD GENERATON 2.056 uIU/mL     LD [[id number]]  (Abnormal) Collected: 06/04/24 1325    Lab Status: Final result Specimen: Blood from Arm, Left Updated: 06/04/24 1356      U/L     Uric acid [[id number]]  (Normal) Collected: 06/04/24 1325    Lab Status: Final result Specimen: Blood from Arm, Left Updated: 06/04/24 1356     Uric Acid 5.7 mg/dL     Narrative:      N-acetyl-p-benzoquinone imine (metabolite of Acetaminophen) will generate erroneously low results in samples for patients that have taken an overdose of Acetaminophen.    Protime-INR [[id number]]  (Normal) Collected: 06/04/24 1319    Lab Status: Final result Specimen: Blood from Arm, Left Updated: 06/04/24 1350     Protime 13.2 seconds      INR 0.94    APTT [[id number]]  (Normal) Collected: 06/04/24 1319    Lab Status: Final result Specimen: Blood from Arm, Left Updated: 06/04/24 1350     PTT 26 seconds     HS Troponin 0hr (reflex protocol) [[id number]]  (Normal) Collected: 06/04/24 1234    Lab Status: Final result Specimen: Blood from Arm, Left Updated: 06/04/24 1312     hs TnI 0hr 3 ng/L     Comprehensive metabolic panel [[id number]]  (Abnormal) Collected: 06/04/24 1234    Lab Status: Final result Specimen: Blood from Arm, Left Updated: 06/04/24 1307     Sodium 136 mmol/L      Potassium 4.1 mmol/L      Chloride 103 mmol/L      CO2 26 mmol/L      ANION GAP 7 mmol/L      BUN 7 mg/dL      Creatinine 0.59 mg/dL      Glucose 111 mg/dL      Calcium 8.8 mg/dL       Corrected Calcium 9.4 mg/dL      AST 27 U/L      ALT 33 U/L      Alkaline Phosphatase 81 U/L      Total Protein 6.1 g/dL      Albumin 3.3 g/dL      Total Bilirubin 0.29 mg/dL      eGFR 110 ml/min/1.73sq m     Narrative:      National Kidney Disease Foundation guidelines for Chronic Kidney Disease (CKD):     Stage 1 with normal or high GFR (GFR > 90 mL/min/1.73 square meters)    Stage 2 Mild CKD (GFR = 60-89 mL/min/1.73 square meters)    Stage 3A Moderate CKD (GFR = 45-59 mL/min/1.73 square meters)    Stage 3B Moderate CKD (GFR = 30-44 mL/min/1.73 square meters)    Stage 4 Severe CKD (GFR = 15-29 mL/min/1.73 square meters)    Stage 5 End Stage CKD (GFR <15 mL/min/1.73 square meters)  Note: GFR calculation is accurate only with a steady state creatinine    CBC and differential [[id number]]  (Abnormal) Collected: 06/04/24 1234    Lab Status: Final result Specimen: Blood from Arm, Left Updated: 06/04/24 1254     WBC 8.04 Thousand/uL      RBC 2.85 Million/uL      Hemoglobin 9.3 g/dL      Hematocrit 28.7 %       fL      MCH 32.6 pg      MCHC 32.4 g/dL      RDW 13.4 %      MPV 10.2 fL      Platelets 281 Thousands/uL      nRBC 0 /100 WBCs      Segmented % 74 %      Immature Grans % 2 %      Lymphocytes % 17 %      Monocytes % 5 %      Eosinophils Relative 2 %      Basophils Relative 0 %      Absolute Neutrophils 6.01 Thousands/µL      Absolute Immature Grans 0.14 Thousand/uL      Absolute Lymphocytes 1.36 Thousands/µL      Absolute Monocytes 0.38 Thousand/µL      Eosinophils Absolute 0.12 Thousand/µL      Basophils Absolute 0.03 Thousands/µL     Trauma tubes on hold [[id number]] Collected: 06/04/24 1234    Lab Status: In process Specimen: Blood from Arm, Left Updated: 06/04/24 1243    Narrative:      The following orders were created for panel order Trauma tubes on hold.  Procedure                               Abnormality         Status                     ---------                               -----------          ------                     Lavender Top 7ml on hold[[id number]]                         In process                   Please view results for these tests on the individual orders.                   XR chest 1 view portable   ED Interpretation by Robinson Sanchez MD (06/04 1512)   My personal interpretation-no acute cardiopulmonary disease appreciated.            Procedures  ECG 12 Lead Documentation Only    Date/Time: 6/4/2024 1:21 PM    Performed by: Robinson Sanchez MD  Authorized by: Robinson Sanchez MD    Patient location:  ED  Interpretation:     Interpretation: normal    Rate:     ECG rate:  79    ECG rate assessment: normal    Rhythm:     Rhythm: sinus rhythm    Ectopy:     Ectopy: none    QRS:     QRS axis:  Normal    QRS intervals:  Normal  Conduction:     Conduction: normal    ST segments:     ST segments:  Normal  T waves:     T waves: normal          ED Course  ED Course as of 06/04/24 1616   Tue Jun 04, 2024   1348 Hemoglobin(!): 9.3  On chart review, patient's hemoglobin was 9.4 on 5/31 through [facility].  Baseline around 12   1348 hs TnI 0hr: 3  No need to repeat.   1404 EKG unremarkable, troponin is 3, low suspicion for ACS at this time.   1427 Patient evaluated by OB, state abdomen soft, normal for postpartum.  Recommend 200 of Venofer and Atarax as well as an abdominal binder.  Patient should follow-up with her OB outpatient for reassessment and management of symptoms.   1605 Discussed with patient plan for following up with her OB/GYN.  Patient states understanding of plan.  Patient tolerated iron infusion well without complication.  At this time patient is agreeable and appropriate for discharge at this time.  Return precautions discussed.  Abdominal binder placed prior to discharge.             HEART Risk Score      Flowsheet Row Most Recent Value   Heart Score Risk Calculator    History 0 Filed at: 06/04/2024 1322   ECG 0 Filed at: 06/04/2024 1322   Age 1 Filed at: 06/04/2024 1322   Risk Factors 0  Filed at: 06/04/2024 1322   Troponin 0 Filed at: 06/04/2024 1322   HEART Score 1 Filed at: 06/04/2024 1322                                  Medical Decision Making  Amount and/or Complexity of Data Reviewed  Labs: ordered. Decision-making details documented in ED Course.  Radiology: ordered and independent interpretation performed.    Risk  Prescription drug management.          Disposition  Final diagnoses:   Postpartum bleeding   Anemia     Time reflects when diagnosis was documented in both MDM as applicable and the Disposition within this note       Time User Action Codes Description Comment    6/4/2024  4:06 PM Robinson Sanchez [O72.1] Postpartum bleeding     6/4/2024  4:06 PM Robinson Sanchez [D64.9] Anemia           ED Disposition       ED Disposition   Discharge    Condition   Stable    Date/Time   Tue Jun 4, 2024 1605    Comment   Kaitlin Abdul discharge to home/self care.                   Follow-up Information       Follow up With Specialties Details Why Contact Info Additional Information    Chanda Deshpande DO [address] [address]  [phone]       [facility] Emergency Department Emergency Medicine   [address] [facility] [address]  [phone] [facility] Emergency Department, [address] [place], [place], [address]    [facility] Obstetrics and Gynecology   [address] [place] [id number] [phone] [facility], [address] [place] [address], [id number] [phone]            Patient's Medications    No medications on file     No discharge procedures on file.    PDMP Review       None             ED Provider  Attending physically available and evaluated Kaitlin Abdul. I managed the patient along with the ED Attending.    Electronically Signed by           Robinson Sanchez MD  06/04/24 [id number]

## 2024-06-04 NOTE — ASSESSMENT & PLAN NOTE
Patient receiving venofer infusions intrapartum  Hgb 9.3 in ED  - Recommend IV Venofer while in ED  - continue PO Iron and prenatal vitamins

## 2024-06-04 NOTE — ED PROCEDURE NOTE
Procedure  POC Pelvic US    Date/Time: 6/4/2024 2:45 PM    Performed by: Angeles Lin MD  Authorized by: Angeles Lin MD    Patient location:  ED  Other Assisting Provider: Yes (comment) (DR Edwin Carrera)    Procedure details:     Exam Type:  Diagnostic and educational    Indications: non-OB pelvic pain      Technique:  Transabdominal GYN (HCG-) exam    Views obtained: uterus (transverse and sagittal) and pouch of Isaac      Image quality: non-diagnostic      Image availability:  Video obtained and still images obtained  Right adnexa findings:     Right ovary:  Not visualized  Left adnexa findings:     Left ovary:  Not visualized  Other findings:     Free pelvic fluid: not identified      Free peritoneal fluid: not identified    Interpretation:     Ultrasound impressions: indeterminate                     Angeles Lin MD  06/04/24 [id number]

## 2024-06-04 NOTE — ASSESSMENT & PLAN NOTE
2+ pitting edema bilaterally  CXR demonstrates normal cardiac silhouette, no pleural effusions   Normal EKG  - Recommend compression stockings and elevating feet

## 2024-06-04 NOTE — ED ATTENDING ATTESTATION
2024  I, Chanda Kramer MD, saw and evaluated the patient. I have discussed the patient with the resident/non-physician practitioner and agree with the resident's/non-physician practitioner's findings, Plan of Care, and MDM as documented in the resident's/non-physician practitioner's note, except where noted. All available labs and Radiology studies were reviewed.  I was present for key portions of any procedure(s) performed by the resident/non-physician practitioner and I was immediately available to provide assistance.       At this point I agree with the current assessment done in the Emergency Department.  I have conducted an independent evaluation of this patient a history and physical is as follows: Patient 5 days post  performed at [facility].  Called OB/GYN today and advised to go to the emergency department patient is noting some slightly for blurred vision and feels like she may have some swelling in her hands and feet.  No weakness or numbness.  No speech difficulty.  No headache.  Feels like her abdomen is slightly distended.  No nausea or vomiting.  She is passing gas.  Normal bowel movements.  No fevers or chills.  On exam cranial nerves are intact.  Heart is regular rate and rhythm, lungs are clear to auscultation bilaterally, abdomen is soft and nontender.  Incision was inspected and there is no erythema.  No drainage.  Normal DTRs bilateral patellar tendons.  No pitting edema is noted.  Plan: Consult OB/GYN as patient had 1 elevated blood pressure in the waiting room.  Will check preeclampsia labs.    ED Course         Critical Care Time  Procedures

## 2024-06-04 NOTE — DISCHARGE INSTRUCTIONS
Please follow-up with your OB for reassessment and further management of your symptoms.    Please follow-up with your family doctor for recheck of your hemoglobin.    Thank you for allowing us to take part in your care.

## 2024-06-06 LAB
BACTERIA UR CULT: ABNORMAL
BACTERIA UR CULT: ABNORMAL
HOLD SPECIMEN: NORMAL

## 2024-11-20 ENCOUNTER — TELEPHONE (OUTPATIENT)
Age: 44
End: 2024-11-20

## 2024-11-20 NOTE — TELEPHONE ENCOUNTER
New Patient to [facility].Patient states she had C/S 5/30/24 @ [facility]. Pain with urination and vaginal dryness and burning with intercourse since delivery. Went to UC today and was advised to f/u with GYN. Not currently pregnant.  Scheduled New patient 1st available appointment and added to wait list.    
Spoke with patient otp. Scheduled sooner appt in [place] office for tomorrow 11/21 at 2:00 with Dr. Victor.  
no

## 2024-11-21 ENCOUNTER — OFFICE VISIT (OUTPATIENT)
Dept: OBGYN CLINIC | Facility: CLINIC | Age: 44
End: 2024-11-21
Payer: MEDICARE

## 2024-11-21 VITALS — WEIGHT: 180 LBS | DIASTOLIC BLOOD PRESSURE: 68 MMHG | SYSTOLIC BLOOD PRESSURE: 110 MMHG

## 2024-11-21 DIAGNOSIS — Z11.3 SCREENING FOR STDS (SEXUALLY TRANSMITTED DISEASES): ICD-10-CM

## 2024-11-21 DIAGNOSIS — N89.8 VAGINAL IRRITATION: Primary | ICD-10-CM

## 2024-11-21 PROCEDURE — 87591 N.GONORRHOEAE DNA AMP PROB: CPT | Performed by: STUDENT IN AN ORGANIZED HEALTH CARE EDUCATION/TRAINING PROGRAM

## 2024-11-21 PROCEDURE — 87491 CHLMYD TRACH DNA AMP PROBE: CPT | Performed by: STUDENT IN AN ORGANIZED HEALTH CARE EDUCATION/TRAINING PROGRAM

## 2024-11-21 PROCEDURE — 87086 URINE CULTURE/COLONY COUNT: CPT | Performed by: STUDENT IN AN ORGANIZED HEALTH CARE EDUCATION/TRAINING PROGRAM

## 2024-11-21 PROCEDURE — 81514 NFCT DS BV&VAGINITIS DNA ALG: CPT | Performed by: STUDENT IN AN ORGANIZED HEALTH CARE EDUCATION/TRAINING PROGRAM

## 2024-11-21 PROCEDURE — 99202 OFFICE O/P NEW SF 15 MIN: CPT | Performed by: STUDENT IN AN ORGANIZED HEALTH CARE EDUCATION/TRAINING PROGRAM

## 2024-11-21 RX ORDER — CLOTRIMAZOLE AND BETAMETHASONE DIPROPIONATE 10; .64 MG/G; MG/G
CREAM TOPICAL 2 TIMES DAILY
Qty: 45 G | Refills: 0 | Status: SHIPPED | OUTPATIENT
Start: 2024-11-21 | End: 2024-11-28

## 2024-11-21 RX ORDER — MUPIROCIN 20 MG/G
OINTMENT TOPICAL 3 TIMES DAILY
COMMUNITY
Start: 2024-08-19

## 2024-11-21 RX ORDER — SULFAMETHOXAZOLE AND TRIMETHOPRIM 800; 160 MG/1; MG/1
TABLET ORAL 2 TIMES DAILY
COMMUNITY
Start: 2024-11-20 | End: 2024-11-30

## 2024-11-21 NOTE — PATIENT INSTRUCTIONS
"PROMOTING VAGINAL AND VULVAR HEALTH      Bathing:   Use only warm water to wash the vagina.  Dry thoroughly with a clean towel.  For general washing use DOVE for sensitive skin, Dial (plain), Neutrogena, Basis, Aveeno.  We recommend using products without fragrances.  We suggest that your partner uses these soaps as well.     The vagina cleanses itself naturally in the form of normal, vaginal discharge. Avoid using douches.  Douches can upset the natural balance of organisms.      Protectant creams:   Petroleum jelly and zinc oxide can be placed on irritated area to create a barrier to help soothe the vulvar when irritated.        Moisturizers:    There are some washes and moisturizers that reportedly decrease the risk of recurrent yeast/ BV infection.  There is limited data on these, but no harm has been reported and they may be worth trying.  They work to restore pH and promote the growth of natural vaginal juan antonio.  Good Clean Love products are organic and their \"Restore\" product line is made specifically to help with this.  Luvena products have enzymes that inhibit BV overproduction.  They can be purchased  at [employer], [email or web address], and online.       Baking soda soaks:   When irritated, soak in lukewarm (not hot) bath water with 4-5 TBSP of baking soda to help sooth vulvar itching and burning.  Soak 1 to 3 times a day for 10 minutes.  If you are doing a sitz bath, use a 1-2 tsp of baking soda.  You also can try AVEENO brand sitz baths.        Clothes:   Rinse underclothes carefully after washing or double-rinse.  Wash all new underclothes before wearing.     Use a mild soap (such as Woolite, Purex, or All Free Clear) for washing underclothes.  Use 1/3 to 1/2 the suggested amount per load.  Do not use detergents.   Do not use fabric softeners or dryer sheets in the washer or dryers.  You can use dryer balls to help soften clothes.   Stain removing products (including bleach): soak and rinse in clear water all " underwear and towels on which you have used a stain removing product.  Then wash in your regular washing cycle using ALL FREE CLEAR.  This removes as much as product as possible.  White vinegar or lemon juice (1/4 to 1/3 cup per laundry load) can be used to freshen clothing and remove oils.      Toileting/ hygiene:   Use soft white toilet tissue. Pat dry rather than wipe if irritated.   Use tampons if able (no deodorant tampons).  If use pads use only cotton liner, not nylon mesh weave that comes in contact with your skin.  We recommend Stayfree, [place], or 7th Generation and try to avoid the Always brand.   Don't scratch.   Avoid feminine hygiene products that can irritate the vulva: feminine spray, deodorants, oils, baby wipes, greases, bubble baths, bath oils, talc, or powder.     Rinse the vulvar with lukewarm water after urination if irritated.   Do not shave or use hair removal products on the vulvar area if irritated.  You can use scissors to trim the pubic hair close to vulvar.  Laser hair removal is an option.      Lactobacilli:   There is some research to support supplementing with 5-10 billion colony-forming units of lactobacilli.  There is limited data on this and these products are not well regulated.  FemDophilus is one that is recommended.  Lactobacilli products require refrigeration for optimal potency.      Diet:    Diet that some recommend is increasing WATER, greek yogurt, cranberry juice or pills, sweat potatoes, kimchi, spinach, broccoli, salmon, garlic, bananas, fruits rich in vitamin C, lemon, avocados, and flaxseed.        Exercise:    If able, try to decrease exercise that put pressure on vulva such as bicycle riding and horseback riding.  When you exercise, use a frozen gel pack wrapped in towel if you are uncomfortable after exercise.  May want to consider avoiding highly chlorinated pools and hot tubs. Try to increase exercise that includes yoga and stretching.        Lubricants:   Many  lubricants can cause irritation.  They can change the vaginal pH and can irritate the vulvar and vagina.     Coconut or olive oil is natural.  You need to watch since these products can erode condoms and may increase the  risk of yeast infections.   Good Clean Love has organic lubricants that may be better and less irritating.   Preseed and Yes lubricants have the same osmolality of your vagina and may be less irritating  to the vagina as well.

## 2024-11-21 NOTE — PROGRESS NOTES
Name: Kaitlin Abdul      : 1980      MRN: [id number]  Encounter Provider: Leticia Victor MD  Encounter Date: 2024   Encounter department: [facility]  :  Assessment & Plan  Vaginal irritation    Orders:    Urine culture    clotrimazole-betamethasone (LOTRISONE) 1-0.05 % cream; Apply topically 2 (two) times a day for 7 days    Chlamydia/GC amplified DNA by PCR    Molecular Vaginal Panel  - discussed possible etiologies including infection or hypoestrogenic state from breastfeeding   - lotrisone rx given for 7 days due to external irritation noted on exam  - information provided on AVS for general vulvar health   - f/u as needed for persistent or worsening sxs   Screening for STDs (sexually transmitted diseases)    Orders:    Chlamydia/GC amplified DNA by PCR    Molecular Vaginal Panel        History of Present Illness       Kaitlin Abdul is a 44 y.o. female who presents vaginal burning and dryness x 1 month associated with dysuria. Has h/o similar sxs years prior, was dxed with yeast infxn. Though she reports she had discharge at that time, denies vaginal discharge today. She is currently breastfeeding     Unable to have intercourse due to sxs  Denies hx of STD  History obtained from: patient    Review of Systems   All other systems reviewed and are negative.    Past Medical History   History reviewed. No pertinent past medical history.  History reviewed. No pertinent surgical history.  History reviewed. No pertinent family history.   reports that she has never smoked. She has never used smokeless tobacco. She reports that she does not currently use alcohol. She reports that she does not currently use drugs.  Current Outpatient Medications on File Prior to Visit   Medication Sig Dispense Refill    MAGNESIUM OXIDE PO Take by mouth      sulfamethoxazole-trimethoprim (BACTRIM DS) 800-160 mg per tablet Take by mouth 2 (two) times a day      amoxicillin-clavulanate (AUGMENTIN) 875-125  mg per tablet Take 1 tablet by mouth 2 (two) times a day (Patient not taking: Reported on 11/21/2024)      Ferrous Sulfate (IRON PO) Take 1 tablet by mouth daily (Patient not taking: Reported on 11/21/2024)      mupirocin (BACTROBAN) 2 % ointment Apply topically 3 (three) times a day To the affected area (Patient not taking: Reported on 11/21/2024)       No current facility-administered medications on file prior to visit.     Allergies   Allergen Reactions    Medical Tape Itching     ECG patches & sandpaper           Objective   /68 (BP Location: Left arm, Patient Position: Sitting, Cuff Size: Standard)   Wt 81.6 kg (180 lb)   Breastfeeding Yes      Physical Exam  Vitals and nursing note reviewed.   Constitutional:       General: She is not in acute distress.     Appearance: She is well-developed.   HENT:      Head: Normocephalic and atraumatic.   Eyes:      Conjunctiva/sclera: Conjunctivae normal.   Cardiovascular:      Rate and Rhythm: Normal rate and regular rhythm.      Heart sounds: No murmur heard.  Pulmonary:      Effort: Pulmonary effort is normal. No respiratory distress.      Breath sounds: Normal breath sounds.   Abdominal:      Palpations: Abdomen is soft.      Tenderness: There is no abdominal tenderness.   Genitourinary:     Exam position: Lithotomy position.      Vagina: Normal.      Cervix: Normal. No cervical motion tenderness or discharge.      Uterus: Normal. Not enlarged and not tender.       Adnexa: Right adnexa normal.        Left: No tenderness or fullness.        Comments: Labial erythema noted   Musculoskeletal:         General: No swelling.      Cervical back: Neck supple.   Skin:     General: Skin is warm and dry.      Capillary Refill: Capillary refill takes less than 2 seconds.   Neurological:      Mental Status: She is alert.   Psychiatric:         Mood and Affect: Mood normal.

## 2024-11-22 ENCOUNTER — TELEPHONE (OUTPATIENT)
Age: 44
End: 2024-11-22

## 2024-11-22 ENCOUNTER — RESULTS FOLLOW-UP (OUTPATIENT)
Dept: OBGYN CLINIC | Facility: CLINIC | Age: 44
End: 2024-11-22

## 2024-11-22 LAB
BACTERIA UR CULT: NORMAL
C GLABRATA DNA VAG QL NAA+PROBE: NEGATIVE
C KRUSEI DNA VAG QL NAA+PROBE: NEGATIVE
C TRACH DNA SPEC QL NAA+PROBE: NEGATIVE
CANDIDA SP 6 PNL VAG NAA+PROBE: NEGATIVE
N GONORRHOEA DNA SPEC QL NAA+PROBE: NEGATIVE
T VAGINALIS DNA VAG QL NAA+PROBE: NEGATIVE
VAGINOSIS/ITIS DNA PNL VAG PROBE+SIG AMP: NEGATIVE

## 2024-11-22 NOTE — TELEPHONE ENCOUNTER
Patient asking about test results ordered by Dr. Victor. Informed patient results are still in process and we will reach out to her once reviewed by provider. Patient verbalized understanding.   Patient also asking how she may be able to find PCP through [employer]. Reviewed with patient she can look through PCP providers within the [email or web address]'s website to find her best fit. Also offered to send msg to Dr. Victor for a possible referral or recommendation for PCP, but patient states she will look through [email or web address]'s website first. Patient denies further questions at this time.

## 2025-07-01 ENCOUNTER — OFFICE VISIT (OUTPATIENT)
Age: 45
End: 2025-07-01
Payer: MEDICARE

## 2025-07-01 VITALS
DIASTOLIC BLOOD PRESSURE: 64 MMHG | SYSTOLIC BLOOD PRESSURE: 120 MMHG | TEMPERATURE: 98 F | HEIGHT: 61 IN | OXYGEN SATURATION: 98 % | WEIGHT: 177 LBS | HEART RATE: 76 BPM | BODY MASS INDEX: 33.42 KG/M2

## 2025-07-01 DIAGNOSIS — G44.219 EPISODIC TENSION-TYPE HEADACHE, NOT INTRACTABLE: ICD-10-CM

## 2025-07-01 DIAGNOSIS — M79.662 PAIN OF LEFT CALF: Primary | ICD-10-CM

## 2025-07-01 DIAGNOSIS — R42 DIZZINESS: ICD-10-CM

## 2025-07-01 PROCEDURE — 99203 OFFICE O/P NEW LOW 30 MIN: CPT

## 2025-07-01 RX ORDER — FLUTICASONE PROPIONATE 50 MCG
2 SPRAY, SUSPENSION (ML) NASAL DAILY
COMMUNITY
Start: 2025-02-06

## 2025-07-01 RX ORDER — METHOCARBAMOL 500 MG/1
500 TABLET, FILM COATED ORAL
Qty: 14 TABLET | Refills: 0 | Status: SHIPPED | OUTPATIENT
Start: 2025-07-01 | End: 2025-07-15

## 2025-07-01 RX ORDER — VITAMIN A, VITAMIN C, VITAMIN D, VITAMIN E, THIAMINE, RIBOFLAVIN, NIACIN, VITAMIN B6, FOLIC ACID, VITAMIN B12, CALCIUM, IRON, ZINC, COPPER 4000; 120; 400; 22; 1.84; 3; 20; 10; 1; 12; 200; 27; 25; 2 [IU]/1; MG/1; [IU]/1; [IU]/1; MG/1; MG/1; MG/1; MG/1; MG/1; UG/1; MG/1; MG/1; MG/1; MG/1
1 TABLET ORAL DAILY
COMMUNITY
Start: 2025-06-02

## 2025-07-01 RX ORDER — ESTRADIOL 0.1 MG/G
CREAM VAGINAL
COMMUNITY
Start: 2025-04-10

## 2025-07-01 NOTE — PROGRESS NOTES
Name: Kaitlin Abdul      : 1980      MRN: [id number]  Encounter Provider: Sheree Mullen MD  Encounter Date: 2025   [facility]  :  Assessment & Plan  Pain of left calf  Patient with 1 month of pain in left calf.  Homans' sign positive.  Denies any chest pain or shortness of breath.  No history of blood clots.  Patient not on OCPs, just has vaginal estrogen cream.  Gave birth a year ago, currently breast-feeding.  Minimal swelling bilateral lower extremities.  -Ordered venous duplex left lower limb.  -Robaxin as needed if it is secondary to muscle spasms.  Orders:    methocarbamol (ROBAXIN) 500 mg tablet; Take 1 tablet (500 mg total) by mouth daily at bedtime as needed for muscle spasms for up to 14 days Prn muscle spasms    VAS VENOUS DUPLEX -LOWER LIMB UNILATERAL; Future    Episodic tension-type headache, not intractable  Patient complains of generalized head pain for the past month.  Responsive to OTC pain medication and sleep.  Has some associated dizziness, no photophobia or phonophobia.  Some times has runny nose.  Typically related to tension in neck muscles.  -Likely tension type headache.  Ordered Robaxin nightly as needed for muscle relaxation.  Patient to continue ibuprofen.  -Discussed lifestyle modifications such as getting enough hydration and sleep.  -Patient to follow-up next week, if symptoms do not resolve, will consider head imaging.  Orders:    methocarbamol (ROBAXIN) 500 mg tablet; Take 1 tablet (500 mg total) by mouth daily at bedtime as needed for muscle spasms for up to 14 days Prn muscle spasms    Dizziness  Patient complains of some dizziness which usually correlates to her headaches.  Might be secondary to her headaches.  Patient has left-sided impacted cerumen as well, might be contributing.  Patient declines getting irrigation in the office.  -Recommended patient use Debrox.  -Will see if headache management improves dizziness as  "well.  Patient to follow-up next week if symptoms do not resolve we will consider head imaging.              History of Present Illness   Patient states that she has had pain on the top of her head, which started 1 month ago.  She states at times the pain is weeklong, although now it has improved to just be 3 times a week.  The pain last the entire day.  It is usually a dull ache, 10 out of 10.  She sometimes experiences nausea with the pain as well.  Sometimes experiences runny nose as well.  She states she gets dizzy as well.  She denies any photophobia or phonophobia.  Pain typically last 1 whole day, sleeping and some pain medications provide temporary relief.  She denies any fevers or chills.  Denies any chest pain or shortness of breath.    Patient also complains of left calf pain, which typically correlates to when she gets these headaches.  She states that it is painful to walk at times as well.  She denies any history of blood clots.  Denies any chest pain or shortness of breath.            Review of Systems   Constitutional:  Negative for chills and fever.   Respiratory:  Negative for shortness of breath.    Cardiovascular:  Negative for chest pain.   Gastrointestinal:  Negative for abdominal pain, diarrhea, nausea and vomiting.   Neurological:  Positive for dizziness.       Objective   /64   Pulse 76   Temp 98 °F (36.7 °C)   Ht 5' 0.63\" (1.54 m)   Wt 80.3 kg (177 lb)   SpO2 98%   BMI 33.85 kg/m²      Physical Exam  Constitutional:       Appearance: Normal appearance.   HENT:      Head: Normocephalic and atraumatic.      Right Ear: Tympanic membrane and ear canal normal.      Left Ear: Tympanic membrane normal. There is impacted cerumen (Severe compared to right).      Nose: Nose normal. No congestion.      Mouth/Throat:      Mouth: Mucous membranes are moist.      Pharynx: Oropharynx is clear.     Eyes:      Extraocular Movements: Extraocular movements intact.      Pupils: Pupils are equal, " round, and reactive to light.       Cardiovascular:      Rate and Rhythm: Normal rate and regular rhythm.      Pulses: Normal pulses.      Heart sounds: Normal heart sounds.      Comments: Left Homans' sign positive  Pulmonary:      Effort: Pulmonary effort is normal.      Breath sounds: Normal breath sounds.   Abdominal:      General: Abdomen is flat.      Palpations: Abdomen is soft.     Musculoskeletal:      Right lower leg: No edema.      Left lower leg: No edema.     Skin:     General: Skin is warm and dry.     Neurological:      General: No focal deficit present.      Mental Status: She is alert.     Psychiatric:         Mood and Affect: Mood normal.

## 2025-07-01 NOTE — PATIENT INSTRUCTIONS
Debrox ear drops, use consistently.    Pain control:  - Tylenol 325 - 650 mg every 4 hours as needed. Please do not exceed 4000 mg/day total.  - Ibuprofen 200 - 400 mg every 4 to 6 hours prn. Please do not exceed 3200 mg/day total.

## 2025-07-08 ENCOUNTER — HOSPITAL ENCOUNTER (OUTPATIENT)
Dept: VASCULAR ULTRASOUND | Facility: HOSPITAL | Age: 45
Discharge: HOME | End: 2025-07-08
Payer: MEDICARE

## 2025-07-08 DIAGNOSIS — M79.662 PAIN OF LEFT CALF: ICD-10-CM

## 2025-07-08 PROCEDURE — 93971 EXTREMITY STUDY: CPT | Performed by: SURGERY

## 2025-07-08 PROCEDURE — 93971 EXTREMITY STUDY: CPT

## 2025-07-14 ENCOUNTER — OFFICE VISIT (OUTPATIENT)
Age: 45
End: 2025-07-14
Payer: MEDICARE

## 2025-07-14 VITALS
BODY MASS INDEX: 34.04 KG/M2 | OXYGEN SATURATION: 98 % | SYSTOLIC BLOOD PRESSURE: 114 MMHG | HEART RATE: 92 BPM | TEMPERATURE: 98 F | DIASTOLIC BLOOD PRESSURE: 72 MMHG | WEIGHT: 178 LBS

## 2025-07-14 DIAGNOSIS — G44.219 EPISODIC TENSION-TYPE HEADACHE, NOT INTRACTABLE: ICD-10-CM

## 2025-07-14 DIAGNOSIS — M79.605 LEFT LEG PAIN: Primary | ICD-10-CM

## 2025-07-14 PROCEDURE — 99213 OFFICE O/P EST LOW 20 MIN: CPT

## 2025-08-19 ENCOUNTER — OFFICE VISIT (OUTPATIENT)
Age: 45
End: 2025-08-19
Payer: MEDICARE

## 2025-08-19 VITALS
OXYGEN SATURATION: 98 % | WEIGHT: 175 LBS | HEART RATE: 74 BPM | TEMPERATURE: 98.2 F | BODY MASS INDEX: 33.47 KG/M2 | DIASTOLIC BLOOD PRESSURE: 70 MMHG | SYSTOLIC BLOOD PRESSURE: 116 MMHG

## 2025-08-19 DIAGNOSIS — H91.92 HEARING DECREASED, LEFT: ICD-10-CM

## 2025-08-19 DIAGNOSIS — R10.84 GENERALIZED ABDOMINAL PAIN: ICD-10-CM

## 2025-08-19 DIAGNOSIS — M79.605 LEFT LEG PAIN: Primary | ICD-10-CM

## 2025-08-19 PROCEDURE — 99213 OFFICE O/P EST LOW 20 MIN: CPT

## 2025-08-20 ENCOUNTER — HOSPITAL ENCOUNTER (OUTPATIENT)
Dept: RADIOLOGY | Facility: HOSPITAL | Age: 45
Discharge: HOME | End: 2025-08-20
Payer: MEDICARE

## 2025-08-20 DIAGNOSIS — M79.605 LEFT LEG PAIN: ICD-10-CM

## 2025-08-20 PROCEDURE — 73552 X-RAY EXAM OF FEMUR 2/>: CPT
